# Patient Record
Sex: MALE | Race: WHITE | Employment: OTHER | URBAN - METROPOLITAN AREA
[De-identification: names, ages, dates, MRNs, and addresses within clinical notes are randomized per-mention and may not be internally consistent; named-entity substitution may affect disease eponyms.]

---

## 2019-11-07 ENCOUNTER — TRANSFERRED RECORDS (OUTPATIENT)
Dept: HEALTH INFORMATION MANAGEMENT | Facility: CLINIC | Age: 84
End: 2019-11-07

## 2019-11-08 ENCOUNTER — TRANSFERRED RECORDS (OUTPATIENT)
Dept: HEALTH INFORMATION MANAGEMENT | Facility: CLINIC | Age: 84
End: 2019-11-08

## 2019-11-15 ENCOUNTER — PRE VISIT (OUTPATIENT)
Dept: ORTHOPEDICS | Facility: CLINIC | Age: 84
End: 2019-11-15

## 2019-11-15 ENCOUNTER — OFFICE VISIT (OUTPATIENT)
Dept: ORTHOPEDICS | Facility: CLINIC | Age: 84
End: 2019-11-15
Payer: MEDICARE

## 2019-11-15 VITALS — WEIGHT: 217.8 LBS | BODY MASS INDEX: 34.18 KG/M2 | HEIGHT: 67 IN

## 2019-11-15 DIAGNOSIS — C79.51 BONE METASTASIS: Primary | ICD-10-CM

## 2019-11-15 DIAGNOSIS — M89.9 BONE LESION: ICD-10-CM

## 2019-11-15 LAB
ERYTHROCYTE [DISTWIDTH] IN BLOOD BY AUTOMATED COUNT: 13 % (ref 10–15)
HCT VFR BLD AUTO: 43.7 % (ref 40–53)
HGB BLD-MCNC: 14.4 G/DL (ref 13.3–17.7)
MCH RBC QN AUTO: 28.9 PG (ref 26.5–33)
MCHC RBC AUTO-ENTMCNC: 33 G/DL (ref 31.5–36.5)
MCV RBC AUTO: 88 FL (ref 78–100)
PLATELET # BLD AUTO: 318 10E9/L (ref 150–450)
RBC # BLD AUTO: 4.99 10E12/L (ref 4.4–5.9)
WBC # BLD AUTO: 9.9 10E9/L (ref 4–11)

## 2019-11-15 PROCEDURE — 83883 ASSAY NEPHELOMETRY NOT SPEC: CPT | Performed by: ORTHOPAEDIC SURGERY

## 2019-11-15 PROCEDURE — 84166 PROTEIN E-PHORESIS/URINE/CSF: CPT | Performed by: ORTHOPAEDIC SURGERY

## 2019-11-15 PROCEDURE — 00000402 ZZHCL STATISTIC TOTAL PROTEIN: Performed by: ORTHOPAEDIC SURGERY

## 2019-11-15 PROCEDURE — 84165 PROTEIN E-PHORESIS SERUM: CPT | Performed by: ORTHOPAEDIC SURGERY

## 2019-11-15 RX ORDER — EZETIMIBE 10 MG/1
10 TABLET ORAL DAILY
COMMUNITY

## 2019-11-15 ASSESSMENT — ENCOUNTER SYMPTOMS
SLEEP DISTURBANCES DUE TO BREATHING: 0
BACK PAIN: 0
DYSURIA: 0
HYPOTENSION: 0
DIZZINESS: 0
PARALYSIS: 0
EXERCISE INTOLERANCE: 0
NUMBNESS: 0
HEADACHES: 0
DIFFICULTY URINATING: 0
NECK PAIN: 0
LEG PAIN: 1
LOSS OF CONSCIOUSNESS: 0
TREMORS: 0
HYPERTENSION: 0
PALPITATIONS: 0
STIFFNESS: 1
MEMORY LOSS: 0
TINGLING: 0
SYNCOPE: 0
ARTHRALGIAS: 1
ORTHOPNEA: 0
DISTURBANCES IN COORDINATION: 0
WEAKNESS: 1
SEIZURES: 0
HEMATURIA: 0
MUSCLE WEAKNESS: 1
MYALGIAS: 1
MUSCLE CRAMPS: 0
FLANK PAIN: 0
SPEECH CHANGE: 0
LIGHT-HEADEDNESS: 0
JOINT SWELLING: 0

## 2019-11-15 ASSESSMENT — MIFFLIN-ST. JEOR: SCORE: 1637.3

## 2019-11-15 NOTE — TELEPHONE ENCOUNTER
DIAGNOSIS: Bone tumor and referred by Dr. Jeffrey Motta at [facility]   APPOINTMENT DATE: 11/15/2019   NOTES STATUS DETAILS   OFFICE NOTE from referring provider Received 11/08/2019  DR JEFFREY MOTTA   OFFICE NOTE from other specialist N/A    DISCHARGE SUMMARY from hospital N/A    DISCHARGE REPORT from the ER In process    OPERATIVE REPORT N/A    MEDICATION LIST In process REQ. SENT TO [facility]   IMPLANT RECORD/STICKER N/A    LABS     CBC/DIFF N/A    CULTURES N/A    INJECTIONS DONE IN RADIOLOGY N/A    MRI IN PROCESS REQ SENT TO [facility]   CT SCAN N/A    XRAYS (IMAGES & REPORTS) N/A    TUMOR     PATHOLOGY  Slides & report N/A

## 2019-11-15 NOTE — LETTER
11/15/2019       RE: Monty Sena  [address] [address]     Dear Colleague,    Thank you for referring your patient, Monty Sena, to the [facility] at [facility]. Please see a copy of my visit note below.        Department of Orthopedic Surgery  Clinic Consultation Note    PATIENT NAME: Monty Sena   MRN: [id number]  AGE: 84 year old  BMI: Body mass index is 34.39 kg/m .  REFERRING PHYSICIAN: Jeffrey Motta      CHIEF COMPLAINT: Consult (Pt. 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 Pt states that he was hit w/a Football and after 3-4wks he was still exp. pain and was seen for an XR and the Lesion was detected along with an MRI the following day.  Referring:  JEFFREY MOTTA )      HISTORY OF PRESENT ILLNESS:  Monty Sena is a 84 year old male with a history of hyperlipidemia and hypothyroidism who presents for evaluation of right thigh pain.  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  He notes however that his pain preexisted, reporting discomfort with weightbearing, as well as some nighttime pain which has interrupted sleep, and therefore presented to his local orthopedic doctor, Dr. Motta, who obtained an x-ray and per patient report there was concern for a lytic lesion in the femur.  An MRI was subsequently obtained and again was concerning for redemonstration of a lesion within the femur.  He was referred to our clinic for further evaluation and recommendations of management.  He denies any pain or limitations prior to the incident 1 month ago.  He notes that he is otherwise been in his usual state of health, denying fevers, chills, weight loss, night sweats,  chest pain, shortness of breath.  He denies any  "personal or family history of musculoskeletal tumors.      ALLERGIES: Patient has no known allergies.    MEDICATIONS:     Current Outpatient Medications:      ezetimibe (ZETIA) 10 MG tablet, Take 10 mg by mouth daily, Disp: , Rfl:      Levothyroxine Sodium (SYNTHROID PO), , Disp: , Rfl:       MEDICAL HISTORY:   Past Medical History:   Diagnosis Date     Bone tumor      Hearing problem      Reduced vision      Thyroid disease        SURGICAL HISTORY:   Past Surgical History:   Procedure Laterality Date     C SHOULDER SURG PROC UNLISTED       KNEE SURGERY         FAMILY HISTORY:   Family History   Problem Relation Age of Onset     Cancer Mother      Obesity Mother      Prostate Cancer Mother      Colon Cancer Mother      Cancer Father      Cancer Sister      No reported family history of musculoskeletal tumors or additional cancer history.     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  He lives at home with his wife in [place].    PHYSICAL EXAMINATION:  Ht 1.695 m (5' 6.73\")   Wt 98.8 kg (217 lb 12.8 oz)   BMI 34.39 kg/m     5' 6.732\"  Body mass index is 34.39 kg/m .    General: awake, alert, cooperative, no apparent distress, appears stated age  HEENT: normocephalic, atraumatic  Respiratory: breathing non-labored, no wheezing  Cardiovascular: nontachycardic  Skin: no rashes or lesions  Neurological: A&Ox3, CN II-XII grossly intact  Pysch: appropriate affect     Musculoskeletal:  Right Lower Extremity: No deformity, skin intact. No significant tenderness to palpation over thigh, knee, leg, ankle/foot. No pain with ROM hip/knee/ankle. Motor intact distally TA/GSC/EHL/FHL with 5/5 strength. SILT " sp/dp/tibial/saph/sural nerves. Distally well perfused.     IMAGING:   Imaging currently not available for personal review, MRI obtained on 11/8/2019 per radiology demonstrates a 18 x 15 x 37 mm lesion within the mid to upper femoral shaft, reported to be involving more of the posterior lateral aspect of the femur and with some erosive changes to the cortex without obvious complete cortical disruption or fracture.  No other reported lesions within the femur.  Soft tissue normal.     ASSESSMENT/PLAN: Monty Sena is a 84 year old male who presents for evaluation of right thigh pain with radiographic findings of a lytic right femoral lesion concerning for myeloma versus metastatic disease and based on imaging report less likely to represent a primary bone sarcoma.  We currently do not have the imaging for personal review, and discussed with the patient that we will work to obtain these images.  Nonetheless, we would recommend initial work-up for myeloma versus metastatic disease including labs for myeloma work-up as well as CT chest abdomen pelvis to assess for potential primary tumor. We discussed that after we reviewed the x-rays, MRI, CT c/a/p, labs we then contact the patient the next steps, including need for biopsy.  We reviewed that should he require a biopsy, would plan to place a right femoral intramedullary nail at the time of surgery to minimize pathologic fracture.  We discussed that given the fact he has been having discomfort in the thigh, would recommend that he use a walker or crutches to minimize weightbearing to the right lower extremity and minimize the risk of fracture.    Patient and his family expressed a good understanding and agreement with the plan.  We will contact him with the results of his work-up and next steps of treatment.. All questions answered.        Patient was seen, discussed, and personally evaluated by Dr. Tan who agrees with the above assessment and plan.     Librado  MD Randi  Orthopedic Surgery PGY-4      Dear Dr. Motta,    Thank you for asking me to see  for evaluation of an ache in his right thigh and imaging studies that suggest a neoplasm in that site.  Imaging was not available today.  Review of his records however show that as you know he has had a intramedullary base lesion in the right femoral diaphysis.  This is most likely.  It was described as being radiolucent.  This makes differential diagnosis include primarily myeloma, metastatic renal or lung.    We have ordered the appropriate myeloma screening studies as well as a chest abdomen pelvic CT.  We will contact him when those results are available and move forward with treatment if there are diagnostic for with a biopsy and possibly surgical stabilization depending on what his imaging shoulder.  I did tell him that he needs to use a walker at all times.  He understands this and will be compliant.    Keep you informed of our findings and appreciate being involved in his care.    Patient was seen today with Dr. Bryan.  I agree with his assessment and plan.  Patient visit was 20 minutes with greater than 15 minutes spent answering questions coordinating his care.    Again, thank you for allowing me to participate in the care of your patient.      Sincerely,    Rudy Tan MD

## 2019-11-15 NOTE — NURSING NOTE
"Chief Complaint   Patient presents with     Consult     Pt. 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 Pt states that he was hit w/a Football and after 3-4wks he was still exp. pain and was seen for an XR and the Lesion was detected along with an MRI the following day.  Referring:  RODOLFO JEFFREY        84 year old  1935    Ht 1.695 m (5' 6.73\")   Wt 98.8 kg (217 lb 12.8 oz)   BMI 34.39 kg/m            [facility] - [place], [place] - [address]    No Known Allergies    Current Outpatient Medications   Medication     ezetimibe (ZETIA) 10 MG tablet     Levothyroxine Sodium (SYNTHROID PO)     No current facility-administered medications for this visit.                "

## 2019-11-15 NOTE — PROGRESS NOTES
Department of Orthopedic Surgery  Clinic Consultation Note          PATIENT NAME: Monty Sena   MRN: [id number]  AGE: 84 year old  BMI: Body mass index is 34.39 kg/m .  REFERRING PHYSICIAN: Jeffrey Motta      CHIEF COMPLAINT: Consult (Pt. 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 Pt states that he was hit w/a Football and after 3-4wks he was still exp. pain and was seen for an XR and the Lesion was detected along with an MRI the following day.  Referring:  JEFFREY MOTTA )      HISTORY OF PRESENT ILLNESS:  Monty Sena is a 84 year old male with a history of hyperlipidemia and hypothyroidism who presents for evaluation of right thigh pain.  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  He notes however that his pain preexisted, reporting discomfort with weightbearing, as well as some nighttime pain which has interrupted sleep, and therefore presented to his local orthopedic doctor, Dr. Motta, who obtained an x-ray and per patient report there was concern for a lytic lesion in the femur.  An MRI was subsequently obtained and again was concerning for redemonstration of a lesion within the femur.  He was referred to our clinic for further evaluation and recommendations of management.  He denies any pain or limitations prior to the incident 1 month ago.  He notes that he is otherwise been in his usual state of health, denying fevers, chills, weight loss, night sweats,  chest pain, shortness of breath.  He denies any personal or family history of musculoskeletal tumors.      ALLERGIES: Patient has no known allergies.    MEDICATIONS:     Current Outpatient Medications:      ezetimibe (ZETIA) 10 MG tablet, Take 10 mg by mouth daily, Disp: , Rfl:      Levothyroxine Sodium (SYNTHROID PO), , Disp: , Rfl:  "      MEDICAL HISTORY:   Past Medical History:   Diagnosis Date     Bone tumor      Hearing problem      Reduced vision      Thyroid disease          SURGICAL HISTORY:   Past Surgical History:   Procedure Laterality Date     C SHOULDER SURG PROC UNLISTED       KNEE SURGERY           FAMILY HISTORY:   Family History   Problem Relation Age of Onset     Cancer Mother      Obesity Mother      Prostate Cancer Mother      Colon Cancer Mother      Cancer Father      Cancer Sister      No reported family history of musculoskeletal tumors or additional cancer history.     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  He lives at home with his wife in [place].    PHYSICAL EXAMINATION:  Ht 1.695 m (5' 6.73\")   Wt 98.8 kg (217 lb 12.8 oz)   BMI 34.39 kg/m    5' 6.732\"  Body mass index is 34.39 kg/m .    General: awake, alert, cooperative, no apparent distress, appears stated age  HEENT: normocephalic, atraumatic  Respiratory: breathing non-labored, no wheezing  Cardiovascular: nontachycardic  Skin: no rashes or lesions  Neurological: A&Ox3, CN II-XII grossly intact  Pysch: appropriate affect     Musculoskeletal:  Right Lower Extremity: No deformity, skin intact. No significant tenderness to palpation over thigh, knee, leg, ankle/foot. No pain with ROM hip/knee/ankle. Motor intact distally TA/GSC/EHL/FHL with 5/5 strength. SILT sp/dp/tibial/saph/sural nerves. Distally well perfused.     IMAGING:   Imaging currently not available for personal review, MRI obtained on 11/8/2019 per radiology demonstrates a 18 x 15 x 37 mm lesion within the mid to upper femoral shaft, reported to be involving more of the posterior lateral " aspect of the femur and with some erosive changes to the cortex without obvious complete cortical disruption or fracture.  No other reported lesions within the femur.  Soft tissue normal.     ASSESSMENT/PLAN: Monty Sena is a 84 year old male who presents for evaluation of right thigh pain with radiographic findings of a lytic right femoral lesion concerning for myeloma versus metastatic disease and based on imaging report less likely to represent a primary bone sarcoma.  We currently do not have the imaging for personal review, and discussed with the patient that we will work to obtain these images.  Nonetheless, we would recommend initial work-up for myeloma versus metastatic disease including labs for myeloma work-up as well as CT chest abdomen pelvis to assess for potential primary tumor. We discussed that after we reviewed the x-rays, MRI, CT c/a/p, labs we then contact the patient the next steps, including need for biopsy.  We reviewed that should he require a biopsy, would plan to place a right femoral intramedullary nail at the time of surgery to minimize pathologic fracture.  We discussed that given the fact he has been having discomfort in the thigh, would recommend that he use a walker or crutches to minimize weightbearing to the right lower extremity and minimize the risk of fracture.    Patient and his family expressed a good understanding and agreement with the plan.  We will contact him with the results of his work-up and next steps of treatment.. All questions answered.        Patient was seen, discussed, and personally evaluated by Dr. Tan who agrees with the above assessment and plan.     Librado Bryan MD  Orthopedic Surgery PGY-4  Answers for HPI/ROS submitted by the patient on 11/15/2019   General Symptoms: No  Skin Symptoms: No  HENT Symptoms: No  EYE SYMPTOMS: No  HEART SYMPTOMS: Yes  LUNG SYMPTOMS: No  INTESTINAL SYMPTOMS: No  URINARY SYMPTOMS: Yes  REPRODUCTIVE SYMPTOMS:  No  SKELETAL SYMPTOMS: Yes  BLOOD SYMPTOMS: No  NERVOUS SYSTEM SYMPTOMS: Yes  MENTAL HEALTH SYMPTOMS: No  Chest pain or pressure: No  Fast or irregular heartbeat: No  Pain in legs with walking: Yes  Trouble breathing while lying down: No  Fingers or toes appear blue: No  High blood pressure: No  Low blood pressure: No  Fainting: No  Murmurs: No  Pacemaker: No  Varicose veins: No  Edema or swelling: No  Wake up at night with shortness of breath: No  Light-headedness: No  Exercise intolerance: No  Trouble holding urine or incontinence: Yes  Pain or burning: No  Trouble starting or stopping: No  Increased frequency of urination: Yes  Blood in urine: No  Decreased frequency of urination: No  Frequent nighttime urination: Yes  Flank pain: No  Difficulty emptying bladder: No  Back pain: No  Muscle aches: Yes  Neck pain: No  Swollen joints: No  Joint pain: Yes  Bone pain: Yes  Muscle cramps: No  Muscle weakness: Yes  Joint stiffness: Yes  Bone fracture: No  Trouble with coordination: No  Dizziness or trouble with balance: No  Fainting or black-out spells: No  Memory loss: No  Headache: No  Seizures: No  Speech problems: No  Tingling: No  Tremor: No  Weakness: Yes  Difficulty walking: Yes  Paralysis: No  Numbness: No

## 2019-11-15 NOTE — PROGRESS NOTES
Addendum: I spoke with the patient and his daughter.  They will reviewed the options.  I reviewed the oncologist note from Yousif Marcos.  Recommending a lung biopsy and surgical treatment of his right femur.  I agree with that however it could be that if he has a nail plates and an open biopsy of his femurs performed that he would not need a lung biopsy.  The family will discuss these options they will call us if they would like to proceed with the surgery at the [facility].  In the meantime we will tentatively try to find and hold over time.      Dear Dr. Motta,    Thank you for asking me to see  for evaluation of an ache in his right thigh and imaging studies that suggest a neoplasm in that site.  Imaging was not available today.  Review of his records however show that as you know he has had a intramedullary base lesion in the right femoral diaphysis.  This is most likely.  It was described as being radiolucent.  This makes differential diagnosis include primarily myeloma, metastatic renal or lung.    We have ordered the appropriate myeloma screening studies as well as a chest abdomen pelvic CT.  We will contact him when those results are available and move forward with treatment if there are diagnostic for with a biopsy and possibly surgical stabilization depending on what his imaging shoulder.  I did tell him that he needs to use a walker at all times.  He understands this and will be compliant.    Keep you informed of our findings and appreciate being involved in his care.    Sincerely    Patient was seen today with Dr. Bryan.  I agree with his assessment and plan.  Patient visit was 20 minutes with greater than 15 minutes spent answering questions coordinating his care.

## 2019-11-18 LAB
ALBUMIN SERPL ELPH-MCNC: 3.8 G/DL (ref 3.7–5.1)
ALPHA1 GLOB SERPL ELPH-MCNC: 0.4 G/DL (ref 0.2–0.4)
ALPHA2 GLOB SERPL ELPH-MCNC: 1.1 G/DL (ref 0.5–0.9)
B-GLOBULIN SERPL ELPH-MCNC: 1.3 G/DL (ref 0.6–1)
GAMMA GLOB SERPL ELPH-MCNC: 1.4 G/DL (ref 0.7–1.6)
KAPPA LC UR-MCNC: 4.82 MG/DL (ref 0.33–1.94)
KAPPA LC/LAMBDA SER: 1.73 {RATIO} (ref 0.26–1.65)
LAMBDA LC SERPL-MCNC: 2.78 MG/DL (ref 0.57–2.63)
M PROTEIN SERPL ELPH-MCNC: 0.1 G/DL
PROT PATTERN SERPL ELPH-IMP: ABNORMAL
PROT PATTERN UR ELPH-IMP: NORMAL

## 2019-11-20 DIAGNOSIS — C7B.03 METASTATIC CARCINOID TUMOR TO BONE (H): Primary | ICD-10-CM

## 2019-11-25 ENCOUNTER — PREP FOR PROCEDURE (OUTPATIENT)
Dept: ORTHOPEDICS | Facility: CLINIC | Age: 84
End: 2019-11-25

## 2019-11-25 DIAGNOSIS — C79.51 METASTATIC BONE TUMOR (H): Primary | ICD-10-CM

## 2019-11-29 ENCOUNTER — HOSPITAL ENCOUNTER (INPATIENT)
Facility: CLINIC | Age: 84
Setting detail: SURGERY ADMIT
End: 2019-11-29
Attending: ORTHOPAEDIC SURGERY | Admitting: ORTHOPAEDIC SURGERY
Payer: MEDICARE

## 2019-11-29 DIAGNOSIS — C79.51 METASTATIC BONE TUMOR (H): ICD-10-CM

## 2019-12-02 RX ORDER — CEFAZOLIN SODIUM 2 G/100ML
2 INJECTION, SOLUTION INTRAVENOUS
Status: CANCELLED | OUTPATIENT
Start: 2019-12-05

## 2019-12-03 RX ORDER — METOPROLOL TARTRATE 25 MG/1
25 TABLET, FILM COATED ORAL 2 TIMES DAILY
COMMUNITY
End: 2019-12-04 | Stop reason: HOSPADM

## 2019-12-03 RX ORDER — IBUPROFEN 200 MG
200 TABLET ORAL EVERY 6 HOURS PRN
COMMUNITY
End: 2019-12-04 | Stop reason: HOSPADM

## 2019-12-03 RX ORDER — ASPIRIN 81 MG/1
81 TABLET, CHEWABLE ORAL DAILY
COMMUNITY
End: 2019-12-04 | Stop reason: HOSPADM

## 2019-12-04 ENCOUNTER — TELEPHONE (OUTPATIENT)
Dept: ORTHOPEDICS | Facility: CLINIC | Age: 84
End: 2019-12-04

## 2019-12-04 NOTE — TELEPHONE ENCOUNTER
Monty had a pre-op H&P done yesterday at the [facility] in [place].  He was diagnosed at that time with Afib.  He will need a cardiology consult and stress echo before he can have surgery. Procedure for tomorrow was cancelled.  He will contact us when the cardiology consult has been completed.

## 2019-12-06 ENCOUNTER — HOSPITAL ENCOUNTER (INPATIENT)
Facility: CLINIC | Age: 84
Setting detail: SURGERY ADMIT
End: 2019-12-06
Attending: ORTHOPAEDIC SURGERY | Admitting: ORTHOPAEDIC SURGERY
Payer: MEDICARE